# Patient Record
Sex: FEMALE | HISPANIC OR LATINO
[De-identification: names, ages, dates, MRNs, and addresses within clinical notes are randomized per-mention and may not be internally consistent; named-entity substitution may affect disease eponyms.]

---

## 2024-09-04 ENCOUNTER — DASHBOARD ENCOUNTERS (OUTPATIENT)
Age: 44
End: 2024-09-04

## 2024-09-05 ENCOUNTER — LAB OUTSIDE AN ENCOUNTER (OUTPATIENT)
Dept: URBAN - METROPOLITAN AREA CLINIC 78 | Facility: CLINIC | Age: 44
End: 2024-09-05

## 2024-09-05 ENCOUNTER — OFFICE VISIT (OUTPATIENT)
Dept: URBAN - METROPOLITAN AREA CLINIC 78 | Facility: CLINIC | Age: 44
End: 2024-09-05
Payer: COMMERCIAL

## 2024-09-05 VITALS
DIASTOLIC BLOOD PRESSURE: 76 MMHG | SYSTOLIC BLOOD PRESSURE: 120 MMHG | BODY MASS INDEX: 45.55 KG/M2 | HEIGHT: 60 IN | HEART RATE: 82 BPM | WEIGHT: 232 LBS | TEMPERATURE: 98 F

## 2024-09-05 DIAGNOSIS — K76.0 HEPATIC STEATOSIS: ICD-10-CM

## 2024-09-05 DIAGNOSIS — Z86.19 HX OF HELICOBACTER INFECTION: ICD-10-CM

## 2024-09-05 DIAGNOSIS — R74.8 ELEVATED LIVER ENZYMES: ICD-10-CM

## 2024-09-05 DIAGNOSIS — R13.19 ESOPHAGEAL DYSPHAGIA: ICD-10-CM

## 2024-09-05 PROBLEM — 408512008: Status: ACTIVE | Noted: 2024-09-05

## 2024-09-05 PROBLEM — 197321007: Status: ACTIVE | Noted: 2024-09-05

## 2024-09-05 PROCEDURE — 99244 OFF/OP CNSLTJ NEW/EST MOD 40: CPT | Performed by: INTERNAL MEDICINE

## 2024-09-05 PROCEDURE — 99204 OFFICE O/P NEW MOD 45 MIN: CPT | Performed by: INTERNAL MEDICINE

## 2024-09-05 RX ORDER — OMEPRAZOLE 40 MG/1
TAKE 1 CAPSULE (40 MG) BY ORAL ROUTE TWICE DAILY, 30 MINUTES BEFORE BREAKFAST AND 30 MINUTES BEFORE DINNER CAPSULE, DELAYED RELEASE PELLETS ORAL 1
Qty: 60 | Refills: 3 | Status: ACTIVE | COMMUNITY
Start: 2019-05-07 | End: 1900-01-01

## 2024-09-05 NOTE — HPI-TODAY'S VISIT:
The patient presents on referral from Ashley Jara MD for fatty liver. A copy of this note will be sent to the referring physician.  The patient has been told she has fatty liver for years.   The patient had initially seen me reporting a one-year history of dysphagia. Her dysphagia has resolved after undergoing empiric esophageal dilation.   She also has a prior history of positive H pylori, based on positive serologies , status post treatment with Metronidazole/PeptoBismol/PPI. H pylori eradication has been confirmed.  She had complained of daily heartburn, now well controlled on Omeprazole QD.   She continues to have mild RUQ abdominal pain, occurring intermittenlty. It is not worse than before. I reassured her it is not related to her GB, based on US findings. She suffers from chronic constipation, which she feels is now better controlled with regular use of Miralax.   The patient otherwise denies any rectal bleeding, diarrhea, and unintentional weight loss.   No FH of esopagheal or stomach cancer. No FH of colon cancer or polyps.   She has never had a colonoscopy.  Summary of prior work up:  - Labs on 6//24: Vitamin D 26, hemoglobin A1c 6.3%, total cholesterol 220, triglycerides 121, HDL 39, .  Glucose 96, BUN 11, creatinine 0.5, sodium 137, potassium 4.1, calcium 9.5, total protein 7.6, BUN 4.4, total bilirubin 0.5, alkaline phosphatase 134, , .  WBC 8.3, hemoglobin 14.3, MCV 89, platelets 217.  Chlamydia and Neisseria negative. - RUQ ultrasound on 5/14/19 disclosed a normal liver, gallbladder, CBD and right kidney. Portal vein is patent. Pancreas partially visualize without focal abnormality.  - EGD by me on 5/10/19 revealed a normal esophagus with no evidence of stricture, however empiric dilation was performed using an 18 to 20 mm balloon (up to 20). Biopsies were negative for EOE, but positive for mild reflux. The stomach was normal and so was the duodenum. Biopsies were negative for H pylori or celiac sprue. - Labs on 3/26/19 reveal the TSH of 1.02, BUN 12, glucose 87, creatinine 0.5, normal electrolytes, TP 7.3, albumin 4.4, TB 0.8, AST 31, ALT 42, AP 87. White blood cell count is 7.6, hemoglobin 14.6, MCV 84, platelet count 200,000.

## 2024-09-10 LAB
AFP, SERUM, TUMOR MARKER: 2.9
ALBUMIN: 4.3
ALKALINE PHOSPHATASE: 134
ALT (SGPT): 72
AST (SGOT): 46
BILIRUBIN, DIRECT: 0.14
BILIRUBIN, TOTAL: 0.7
ELF(TM) SCORE: 9.24
PROTEIN, TOTAL: 7.2

## 2024-09-20 ENCOUNTER — CLAIMS CREATED FROM THE CLAIM WINDOW (OUTPATIENT)
Dept: URBAN - METROPOLITAN AREA SURGERY CENTER 15 | Facility: SURGERY CENTER | Age: 44
End: 2024-09-20
Payer: COMMERCIAL

## 2024-09-20 ENCOUNTER — CLAIMS CREATED FROM THE CLAIM WINDOW (OUTPATIENT)
Dept: URBAN - METROPOLITAN AREA CLINIC 4 | Facility: CLINIC | Age: 44
End: 2024-09-20
Payer: COMMERCIAL

## 2024-09-20 DIAGNOSIS — Z12.11 COLON CANCER SCREENING: ICD-10-CM

## 2024-09-20 DIAGNOSIS — K63.5 POLYP OF COLON: ICD-10-CM

## 2024-09-20 DIAGNOSIS — K64.8 OTHER HEMORRHOIDS: ICD-10-CM

## 2024-09-20 DIAGNOSIS — K63.5 HYPERPLASTIC COLON POLYP: ICD-10-CM

## 2024-09-20 DIAGNOSIS — K63.5 BENIGN COLON POLYP: ICD-10-CM

## 2024-09-20 DIAGNOSIS — D12.4 BENIGN NEOPLASM OF DESCENDING COLON: ICD-10-CM

## 2024-09-20 DIAGNOSIS — D12.4 ADENOMA OF DESCENDING COLON: ICD-10-CM

## 2024-09-20 PROCEDURE — 00811 ANES LWR INTST NDSC NOS: CPT | Performed by: NURSE ANESTHETIST, CERTIFIED REGISTERED

## 2024-09-20 PROCEDURE — 45380 COLONOSCOPY AND BIOPSY: CPT | Performed by: INTERNAL MEDICINE

## 2024-09-20 PROCEDURE — 88305 TISSUE EXAM BY PATHOLOGIST: CPT | Performed by: PATHOLOGY

## 2024-09-20 PROCEDURE — 45385 COLONOSCOPY W/LESION REMOVAL: CPT | Performed by: INTERNAL MEDICINE

## 2024-09-20 RX ORDER — OMEPRAZOLE 40 MG/1
TAKE 1 CAPSULE (40 MG) BY ORAL ROUTE TWICE DAILY, 30 MINUTES BEFORE BREAKFAST AND 30 MINUTES BEFORE DINNER CAPSULE, DELAYED RELEASE PELLETS ORAL 1
Qty: 60 | Refills: 3 | Status: ACTIVE | COMMUNITY
Start: 2019-05-07 | End: 1900-01-01